# Patient Record
Sex: MALE | Race: WHITE | NOT HISPANIC OR LATINO | Employment: PART TIME
[De-identification: names, ages, dates, MRNs, and addresses within clinical notes are randomized per-mention and may not be internally consistent; named-entity substitution may affect disease eponyms.]

---

## 2017-07-08 ENCOUNTER — HEALTH MAINTENANCE LETTER (OUTPATIENT)
Age: 22
End: 2017-07-08

## 2020-09-22 ENCOUNTER — OFFICE VISIT (OUTPATIENT)
Dept: FAMILY MEDICINE | Facility: CLINIC | Age: 25
End: 2020-09-22
Payer: COMMERCIAL

## 2020-09-22 VITALS
HEART RATE: 87 BPM | DIASTOLIC BLOOD PRESSURE: 84 MMHG | TEMPERATURE: 98 F | HEIGHT: 71 IN | RESPIRATION RATE: 14 BRPM | WEIGHT: 184.4 LBS | SYSTOLIC BLOOD PRESSURE: 136 MMHG | BODY MASS INDEX: 25.81 KG/M2 | OXYGEN SATURATION: 99 %

## 2020-09-22 DIAGNOSIS — Z11.3 SCREEN FOR STD (SEXUALLY TRANSMITTED DISEASE): ICD-10-CM

## 2020-09-22 DIAGNOSIS — R30.0 DYSURIA: Primary | ICD-10-CM

## 2020-09-22 DIAGNOSIS — Z23 NEED FOR VACCINATION: ICD-10-CM

## 2020-09-22 DIAGNOSIS — Z23 NEED FOR PROPHYLACTIC VACCINATION AND INOCULATION AGAINST INFLUENZA: ICD-10-CM

## 2020-09-22 LAB
ALBUMIN UR-MCNC: NEGATIVE MG/DL
APPEARANCE UR: CLEAR
BILIRUB UR QL STRIP: NEGATIVE
COLOR UR AUTO: YELLOW
GLUCOSE UR STRIP-MCNC: NEGATIVE MG/DL
HGB UR QL STRIP: NEGATIVE
KETONES UR STRIP-MCNC: NEGATIVE MG/DL
LEUKOCYTE ESTERASE UR QL STRIP: NEGATIVE
NITRATE UR QL: NEGATIVE
PH UR STRIP: 7 PH (ref 5–7)
RBC #/AREA URNS AUTO: NORMAL /HPF
SOURCE: NORMAL
SP GR UR STRIP: 1.01 (ref 1–1.03)
UROBILINOGEN UR STRIP-ACNC: 0.2 EU/DL (ref 0.2–1)
WBC #/AREA URNS AUTO: NORMAL /HPF

## 2020-09-22 PROCEDURE — 87591 N.GONORRHOEAE DNA AMP PROB: CPT | Performed by: FAMILY MEDICINE

## 2020-09-22 PROCEDURE — 86780 TREPONEMA PALLIDUM: CPT | Performed by: FAMILY MEDICINE

## 2020-09-22 PROCEDURE — 99203 OFFICE O/P NEW LOW 30 MIN: CPT | Mod: 25 | Performed by: FAMILY MEDICINE

## 2020-09-22 PROCEDURE — 86803 HEPATITIS C AB TEST: CPT | Performed by: FAMILY MEDICINE

## 2020-09-22 PROCEDURE — 36415 COLL VENOUS BLD VENIPUNCTURE: CPT | Performed by: FAMILY MEDICINE

## 2020-09-22 PROCEDURE — 87389 HIV-1 AG W/HIV-1&-2 AB AG IA: CPT | Performed by: FAMILY MEDICINE

## 2020-09-22 PROCEDURE — 87491 CHLMYD TRACH DNA AMP PROBE: CPT | Performed by: FAMILY MEDICINE

## 2020-09-22 PROCEDURE — 90714 TD VACC NO PRESV 7 YRS+ IM: CPT | Performed by: FAMILY MEDICINE

## 2020-09-22 PROCEDURE — 90686 IIV4 VACC NO PRSV 0.5 ML IM: CPT | Performed by: FAMILY MEDICINE

## 2020-09-22 PROCEDURE — 90471 IMMUNIZATION ADMIN: CPT | Performed by: FAMILY MEDICINE

## 2020-09-22 PROCEDURE — 90472 IMMUNIZATION ADMIN EACH ADD: CPT | Performed by: FAMILY MEDICINE

## 2020-09-22 PROCEDURE — 81001 URINALYSIS AUTO W/SCOPE: CPT | Performed by: FAMILY MEDICINE

## 2020-09-22 ASSESSMENT — MIFFLIN-ST. JEOR: SCORE: 1852.52

## 2020-09-22 NOTE — PATIENT INSTRUCTIONS
STD screening tests have been ordered today.    You will be contacted in 1-2 days for results of your lab tests.    STD spread is by direct contact sexually to active infection.    You are advised to use condoms all the time for sexual intercourse, unless you and your partner are trying to conceive.    Practice safe sex all the time.      Patient Education     Dysuria with Uncertain Cause (Adult)    The urethra is the tube that allows urine to pass out of the body. In a woman, the urethra is the opening above the vagina. In men, the urethra is the opening on the tip of the penis. Dysuria is the feeling of pain or burning in the urethra when passing urine.  Dysuria can be caused by anything that irritates or inflames the urethra. An infection or chemical irritation can cause this reaction. A bladder infection is the most common cause of dysuria in adults. A urine test can diagnose this. A bladder infection needs antibiotic treatment.  Soaps, lotions, colognes and feminine hygiene products can cause dysuria. So can birth control jellies, creams, and foams. It will go away 1 to 3 days after using these irritants.  Sexually transmitted diseases (STDs) such as chlamydia or gonorrhea can cause dysuria. Your healthcare provider may take a culture sample. Your provider may start you on antibiotic medicine before the culture test returns.  In women who have gone through menopause, dysuria can be from dryness in the lining of the urethra. This can be treated with hormones. Dysuria becomes long-term (chronic) when it lasts for weeks or months. You may need to see a specialist (urologist) to diagnose and treat chronic dysuria.  Home care  These home care tips may help:    Don't use any chemicals or products that you think may be causing your symptoms.    If you were given a prescription medicine, take as directed. Be sure to take it until it is all used up.    If a culture was taken, don't have sex until you have been told that  it is negative. This means you don't have an infection. Then follow your healthcare provider's advice to treat your condition.  If a culture was done and it is positive:    Both you and your sexual partner may need to be treated. This is true even if your partner has no symptoms.    Contact your healthcare provider or go to an urgent care clinic or the public health department to be looked at and treated.    Don't have sex until both you and your partner(s) have finished all antibiotics and your healthcare provider says you are no longer contagious.    Learn about and use safe sex practices. The safest sex is with a partner who has tested negative and only has sex with you. Condoms can prevent STDs from spreading, but they aren't a guarantee.  Follow-up care  Follow up with your healthcare provider, or as advised. If a culture was taken, you may call as directed for the results. If you have an STD, follow up with your provider or the public health department for a complete STD screening, including HIV testing. For more information, contact CDC-INFO at 994-043-1791.  When to seek medical advice  Call your healthcare provider right away if any of these occur:    You aren't better after 3 days of treatment    Fever of 100.4 F (38 C) or higher, or as directed by your healthcare provider    Back or belly pain that gets worse    You can't urinate because of pain    New discharge from the urethra, vagina, or penis    Painful sores on the penis    Rash or joint pain    Painful lumps (lymph nodes) in the groin    Testicle pain or swelling of the scrotum  Date Last Reviewed: 11/1/2016 2000-2019 [employer]. [address] [place] [address], [place] [address]. All rights reserved. This information is not intended as a substitute for professional medical care. Always follow your healthcare professional's instructions.

## 2020-09-22 NOTE — PROGRESS NOTES
Subjective     Santos Hall is a 24 year old male who presents to clinic today for the following health issues:  Chief Complaint   Patient presents with     STD     Pt here for std screening, possible exposure.       HPI       Genitourinary - Male-possible std  Onset/Duration: 3 months  Description:   Dysuria (painful urination): YES}  Hematuria (blood in urine): no  Frequency: YES- slight increase  Waking at night to urinate: no  Hesitancy (delay in urine): no  Retention (unable to empty): no  Decrease in urinary flow: no  Incontinence: no  Progression of Symptoms:  same and constant  Accompanying Signs & Symptoms:  Fever: no  Back/Flank pain: no  Urethral discharge: no  Testicle lumps/masses/pain: no  Nausea and/or vomiting: no  Abdominal pain: no  History:   History of frequent UTI s: no  History of kidney stones: no  History of hernias: no  Sexually active: YES- possible exposure to std in late May   Patient  did have gonnorrhea and chlamydia in the past - this was 3-4 yrs ago - treated adequately.  Precipitating or alleviating factors: None  Therapies tried and outcome: none    There is no problem list on file for this patient.    History reviewed. No pertinent surgical history.    Social History     Tobacco Use     Smoking status: Never Smoker     Smokeless tobacco: Never Used   Substance Use Topics     Alcohol use: No     Alcohol/week: 0.0 standard drinks     Family History   Problem Relation Age of Onset     Hypertension Paternal Grandmother      Other Cancer Paternal Grandmother      Diabetes Paternal Grandfather      Asthma Sister          No current outpatient medications on file.     Allergies   Allergen Reactions     No Known Drug Allergies        Review of Systems   Constitutional, HEENT, cardiovascular, pulmonary, GI, , musculoskeletal, neuro, skin, endocrine and psych systems are negative, except as otherwise noted.      Objective    /84   Pulse 87   Temp 98  F (36.7  C) (Tympanic)   Resp  "14   Ht 1.81 m (5' 11.25\")   Wt 83.6 kg (184 lb 6.4 oz)   SpO2 99%   BMI 25.54 kg/m    Body mass index is 25.54 kg/m .  Physical Exam   GENERAL: alert and no distress, ambulatory w/o assist  SKIN: no suspicious lesions, no rashes  BACK: no CVA tenderness  ABD:  Flat, soft, no tenderness, no guarding, no mass palpable on exam    No results found for this or any previous visit (from the past 24 hour(s)).        Assessment & Plan     Santos was seen today for std.    Diagnoses and all orders for this visit:    Dysuria  -     Neisseria gonorrhoeae PCR  -     Chlamydia trachomatis PCR  -     UA with Microscopic reflex to Culture    Screen for STD (sexually transmitted disease)  -     Neisseria gonorrhoeae PCR  -     Hepatitis C Screen Reflex to HCV RNA Quant and Genotype  -     HIV Antigen Antibody Combo  -     Chlamydia trachomatis PCR  -     Treponema Abs w Reflex to RPR and Titer         Patient would like to get his flu shot and Td today.    Patient Instructions   STD screening tests have been ordered today.    You will be contacted in 1-2 days for results of your lab tests.    STD spread is by direct contact sexually to active infection.    You are advised to use condoms all the time for sexual intercourse, unless you and your partner are trying to conceive.    Practice safe sex all the time.      Patient Education     Dysuria with Uncertain Cause (Adult)    The urethra is the tube that allows urine to pass out of the body. In a woman, the urethra is the opening above the vagina. In men, the urethra is the opening on the tip of the penis. Dysuria is the feeling of pain or burning in the urethra when passing urine.  Dysuria can be caused by anything that irritates or inflames the urethra. An infection or chemical irritation can cause this reaction. A bladder infection is the most common cause of dysuria in adults. A urine test can diagnose this. A bladder infection needs antibiotic treatment.  Soaps, lotions, " colognes and feminine hygiene products can cause dysuria. So can birth control jellies, creams, and foams. It will go away 1 to 3 days after using these irritants.  Sexually transmitted diseases (STDs) such as chlamydia or gonorrhea can cause dysuria. Your healthcare provider may take a culture sample. Your provider may start you on antibiotic medicine before the culture test returns.  In women who have gone through menopause, dysuria can be from dryness in the lining of the urethra. This can be treated with hormones. Dysuria becomes long-term (chronic) when it lasts for weeks or months. You may need to see a specialist (urologist) to diagnose and treat chronic dysuria.  Home care  These home care tips may help:    Don't use any chemicals or products that you think may be causing your symptoms.    If you were given a prescription medicine, take as directed. Be sure to take it until it is all used up.    If a culture was taken, don't have sex until you have been told that it is negative. This means you don't have an infection. Then follow your healthcare provider's advice to treat your condition.  If a culture was done and it is positive:    Both you and your sexual partner may need to be treated. This is true even if your partner has no symptoms.    Contact your healthcare provider or go to an urgent care clinic or the public health department to be looked at and treated.    Don't have sex until both you and your partner(s) have finished all antibiotics and your healthcare provider says you are no longer contagious.    Learn about and use safe sex practices. The safest sex is with a partner who has tested negative and only has sex with you. Condoms can prevent STDs from spreading, but they aren't a guarantee.  Follow-up care  Follow up with your healthcare provider, or as advised. If a culture was taken, you may call as directed for the results. If you have an STD, follow up with your provider or the public health  department for a complete STD screening, including HIV testing. For more information, contact CDC-INFO at 272-350-1468.  When to seek medical advice  Call your healthcare provider right away if any of these occur:    You aren't better after 3 days of treatment    Fever of 100.4 F (38 C) or higher, or as directed by your healthcare provider    Back or belly pain that gets worse    You can't urinate because of pain    New discharge from the urethra, vagina, or penis    Painful sores on the penis    Rash or joint pain    Painful lumps (lymph nodes) in the groin    Testicle pain or swelling of the scrotum  Date Last Reviewed: 11/1/2016 2000-2019 [employer]. [address] [place] [address], [place] [address]. All rights reserved. This information is not intended as a substitute for professional medical care. Always follow your healthcare professional's instructions.               Return in about 1 week (around 9/29/2020) for If condition does not improve.    Gianni Rico MD  [facility]

## 2020-09-22 NOTE — NURSING NOTE
Prior to immunization administration, verified patients identity using patient s name and date of birth. Please see Immunization Activity for additional information.     Screening Questionnaire for Adult Immunization    Are you sick today?   No   Do you have allergies to medications, food, a vaccine component or latex?   No   Have you ever had a serious reaction after receiving a vaccination?   No   Do you have a long-term health problem with heart, lung, kidney, or metabolic disease (e.g., diabetes), asthma, a blood disorder, no spleen, complement component deficiency, a cochlear implant, or a spinal fluid leak?  Are you on long-term aspirin therapy?   No   Do you have cancer, leukemia, HIV/AIDS, or any other immune system problem?   No   Do you have a parent, brother, or sister with an immune system problem?   No   In the past 3 months, have you taken medications that affect  your immune system, such as prednisone, other steroids, or anticancer drugs; drugs for the treatment of rheumatoid arthritis, Crohn s disease, or psoriasis; or have you had radiation treatments?   No   Have you had a seizure, or a brain or other nervous system problem?   No   During the past year, have you received a transfusion of blood or blood    products, or been given immune (gamma) globulin or antiviral drug?   No   For women: Are you pregnant or is there a chance you could become       pregnant during the next month?   No   Have you received any vaccinations in the past 4 weeks?   No     Immunization questionnaire answers were all negative.        Per orders of Dr. Rico, injection of TD given by Kailee De La Rosa CMA. Patient instructed to remain in clinic for 15 minutes afterwards, and to report any adverse reaction to me immediately.       Screening performed by Kailee De La Rosa CMA on 9/22/2020 at 1:52 PM.

## 2020-09-23 LAB
C TRACH DNA SPEC QL NAA+PROBE: NEGATIVE
HCV AB SERPL QL IA: NONREACTIVE
HIV 1+2 AB+HIV1 P24 AG SERPL QL IA: NONREACTIVE
N GONORRHOEA DNA SPEC QL NAA+PROBE: NEGATIVE
SPECIMEN SOURCE: NORMAL
SPECIMEN SOURCE: NORMAL
T PALLIDUM AB SER QL: NONREACTIVE

## 2020-09-24 ENCOUNTER — TELEPHONE (OUTPATIENT)
Dept: FAMILY MEDICINE | Facility: CLINIC | Age: 25
End: 2020-09-24

## 2020-09-24 DIAGNOSIS — R39.9 URINARY SYMPTOM OR SIGN: Primary | ICD-10-CM

## 2020-09-24 NOTE — TELEPHONE ENCOUNTER
Message left for patient to return call to clinic.  CSS - ok to deliver message below.    Shraddha BAUER RN BSN

## 2020-09-24 NOTE — TELEPHONE ENCOUNTER
Patient was seen 9-22-20 for urinary symptoms.  STD and UA came back normal.  He is pushing fluids.  Symptoms persist - mild burning with urinaation, urgency, cloudy urine, foul odor.  He is to schedule VV if symptoms persist.  He is wondering if all labs are negative, how long does he wait for symptoms to subside?  Denies new or worsening symptoms, no fever, no blood in urine.      Routing to provider.  Shraddha BAUER RN

## 2020-09-24 NOTE — TELEPHONE ENCOUNTER
Patient notified of Dr. Rico's advice.  He is in the middle of class and could not make appts.  RN offered to make appts and he can call back after class and get the info.  CSS, when he calls back, give him msg below.  RN scheduled him for UA lab in [place] 8:30am tomorrow.  He then has a VV via telephone call 4:20pm with Dr. Varner (Dr. Rico is not in tomorrow).    Shraddha BAUER RN BSN

## 2020-09-24 NOTE — TELEPHONE ENCOUNTER
Reason for call:    Symptom or request:     Patient called stating he was seen on 09/22; he states that he continues to have urinary sx-- cloudy, odor to urine, burning sensations. Increase in frequency.       Best Time:  any    Can we leave a detailed message on this number?  YES     Candie ISAAC  Station

## 2020-09-24 NOTE — TELEPHONE ENCOUNTER
Repeat  urinalysis ordered as patient continues to experience the cloudy and foul smelling urine.  Schedule follow up virtual visit in the next 24 hours - make sure that the urinalysis is completed beforehand so there will be results to go over and help decide on next steps.

## 2020-09-25 ENCOUNTER — VIRTUAL VISIT (OUTPATIENT)
Dept: FAMILY MEDICINE | Facility: CLINIC | Age: 25
End: 2020-09-25
Payer: COMMERCIAL

## 2020-09-25 DIAGNOSIS — R30.0 DYSURIA: Primary | ICD-10-CM

## 2020-09-25 DIAGNOSIS — R39.9 URINARY SYMPTOM OR SIGN: ICD-10-CM

## 2020-09-25 LAB
ALBUMIN UR-MCNC: NEGATIVE MG/DL
AMORPH CRY #/AREA URNS HPF: ABNORMAL /HPF
APPEARANCE UR: ABNORMAL
BILIRUB UR QL STRIP: NEGATIVE
COLOR UR AUTO: YELLOW
GLUCOSE UR STRIP-MCNC: NEGATIVE MG/DL
HGB UR QL STRIP: NEGATIVE
KETONES UR STRIP-MCNC: NEGATIVE MG/DL
LEUKOCYTE ESTERASE UR QL STRIP: NEGATIVE
NITRATE UR QL: NEGATIVE
PH UR STRIP: 6.5 PH (ref 5–7)
RBC #/AREA URNS AUTO: ABNORMAL /HPF
SOURCE: ABNORMAL
SP GR UR STRIP: 1.02 (ref 1–1.03)
UROBILINOGEN UR STRIP-ACNC: 1 EU/DL (ref 0.2–1)
WBC #/AREA URNS AUTO: ABNORMAL /HPF

## 2020-09-25 PROCEDURE — 99213 OFFICE O/P EST LOW 20 MIN: CPT | Mod: TEL | Performed by: INTERNAL MEDICINE

## 2020-09-25 PROCEDURE — 81001 URINALYSIS AUTO W/SCOPE: CPT | Performed by: FAMILY MEDICINE

## 2020-09-25 NOTE — PROGRESS NOTES
"Santos Hall is a 24 year old male who is being evaluated via a billable telephone visit.      The patient has been notified of following:     \"This telephone visit will be conducted via a call between you and your physician/provider. We have found that certain health care needs can be provided without the need for a physical exam.  This service lets us provide the care you need with a short phone conversation.  If a prescription is necessary we can send it directly to your pharmacy.  If lab work is needed we can place an order for that and you can then stop by our lab to have the test done at a later time.    Telephone visits are billed at different rates depending on your insurance coverage. During this emergency period, for some insurers they may be billed the same as an in-person visit.  Please reach out to your insurance provider with any questions.    If during the course of the call the physician/provider feels a telephone visit is not appropriate, you will not be charged for this service.\"    Patient has given verbal consent for Telephone visit?  Yes    What phone number would you like to be contacted at? [phone]    How would you like to obtain your AVS? Mail a copy    Subjective     Santos Hall is a 24 year old male who presents via phone visit today for the following health issues:    Chief Complaint   Patient presents with     Results     Would look to go over UA results from today          HPI     Genitourinary symptoms      Duration: 3 to 4 months     Description:  Very mild dysuria and urine has had a strong smell    Intensity:  mild    Accompanying signs and symptoms (fever/discharge/nausea/vomiting/back or abdominal pain):  No flank pain.  No hematuria.  No rash around the urethra or discharge.  No fevers or chills.  No pelvic pain    History (frequent UTI's/kidney stones/prostate problems): None  Sexually active: YES    Precipitating or alleviating factors: None    Therapies tried and outcome: " none.  Drinking plenty of water     9/22/20 - Saw by Dr. Rico for Dysuria + STD   Yesterday Pt reported that continues have urinary sx - cloudy, odor to urine, burning sensations. Slight increase in frequency. - RN triaged : state pt is pushing fluids all labs from 9/22/20 come back normal. - advise by DR. Rico to repeat UA and schedule virtual visit.         Review of Systems   Constitutional and gu systems are negative, except as otherwise noted.       Objective          Vitals:  No vitals were obtained today due to virtual visit.    healthy, alert and no distress  PSYCH: Alert and oriented times 3; coherent speech, normal   rate and volume, able to articulate logical thoughts, able   to abstract reason, no tangential thoughts, no hallucinations   or delusions  His affect is normal  RESP: No cough, no audible wheezing, able to talk in full sentences  Remainder of exam unable to be completed due to telephone visits    Results for orders placed or performed in visit on 09/25/20 (from the past 24 hour(s))   UA with Microscopic reflex to Culture    Specimen: Midstream Urine   Result Value Ref Range    Color Urine Yellow     Appearance Urine Cloudy     Glucose Urine Negative NEG^Negative mg/dL    Bilirubin Urine Negative NEG^Negative    Ketones Urine Negative NEG^Negative mg/dL    Specific Gravity Urine 1.020 1.003 - 1.035    pH Urine 6.5 5.0 - 7.0 pH    Protein Albumin Urine Negative NEG^Negative mg/dL    Urobilinogen Urine 1.0 0.2 - 1.0 EU/dL    Nitrite Urine Negative NEG^Negative    Blood Urine Negative NEG^Negative    Leukocyte Esterase Urine Negative NEG^Negative    Source Midstream Urine     WBC Urine 0 - 5 OTO5^0 - 5 /HPF    RBC Urine O - 2 OTO2^O - 2 /HPF    Amorphous Crystals Many (A) NEG^Negative /HPF           Assessment/Plan:    Assessment & Plan     Dysuria    Santos presents with a few months of ongoing mild dysuria and slight increase in frequency.  U/A and STI testing were done on 9/22 and were normal.  U/A repeated today and only showed some amorphous crystals.  He is not having any symptoms to indicate renal stones- discussed getting U/S to further eval these crystals but he defers for now.  No glucose in the urine to suggest diabetes as cause for increased urinary frequency.  Overall, his symptoms are fairly mild and he is reassured that testing has not shown any concerns.  He will follow-up with urology if symptoms worsen.      - UROLOGY ADULT REFERRAL; Future     GriceldaJami Varner MD  [facility]    Phone call duration:  5 minutes

## 2021-05-23 ENCOUNTER — HEALTH MAINTENANCE LETTER (OUTPATIENT)
Age: 26
End: 2021-05-23

## 2021-09-12 ENCOUNTER — HEALTH MAINTENANCE LETTER (OUTPATIENT)
Age: 26
End: 2021-09-12

## 2022-06-19 ENCOUNTER — HEALTH MAINTENANCE LETTER (OUTPATIENT)
Age: 27
End: 2022-06-19

## 2022-09-09 ENCOUNTER — APPOINTMENT (OUTPATIENT)
Dept: GENERAL RADIOLOGY | Facility: CLINIC | Age: 27
End: 2022-09-09
Attending: EMERGENCY MEDICINE
Payer: COMMERCIAL

## 2022-09-09 ENCOUNTER — HOSPITAL ENCOUNTER (OUTPATIENT)
Facility: CLINIC | Age: 27
Setting detail: OBSERVATION
Discharge: HOME | End: 2022-09-10
Attending: EMERGENCY MEDICINE | Admitting: INTERNAL MEDICINE
Payer: COMMERCIAL

## 2022-09-09 ENCOUNTER — APPOINTMENT (OUTPATIENT)
Dept: CT IMAGING | Facility: CLINIC | Age: 27
End: 2022-09-09
Attending: EMERGENCY MEDICINE
Payer: COMMERCIAL

## 2022-09-09 DIAGNOSIS — J93.83 SPONTANEOUS PNEUMOTHORAX: Primary | ICD-10-CM

## 2022-09-09 DIAGNOSIS — R07.81 PLEURITIC CHEST PAIN: ICD-10-CM

## 2022-09-09 DIAGNOSIS — J93.83 SPONTANEOUS PNEUMOTHORAX: ICD-10-CM

## 2022-09-09 DIAGNOSIS — U07.1 LAB TEST POSITIVE FOR DETECTION OF COVID-19 VIRUS: ICD-10-CM

## 2022-09-09 DIAGNOSIS — R93.89 ABNORMAL CT OF THE CHEST: ICD-10-CM

## 2022-09-09 PROBLEM — J45.990 EXERCISE-INDUCED ASTHMA: Status: ACTIVE | Noted: 2022-09-09

## 2022-09-09 LAB
ANION GAP SERPL CALCULATED.3IONS-SCNC: 15 MMOL/L (ref 7–15)
BASOPHILS # BLD AUTO: 0 10E3/UL (ref 0–0.2)
BASOPHILS NFR BLD AUTO: 0 %
BUN SERPL-MCNC: 19.3 MG/DL (ref 6–20)
CALCIUM SERPL-MCNC: 9 MG/DL (ref 8.6–10)
CHLORIDE SERPL-SCNC: 104 MMOL/L (ref 98–107)
CREAT SERPL-MCNC: 1.08 MG/DL (ref 0.67–1.17)
DEPRECATED HCO3 PLAS-SCNC: 19 MMOL/L (ref 22–29)
EOSINOPHIL # BLD AUTO: 0.1 10E3/UL (ref 0–0.7)
EOSINOPHIL NFR BLD AUTO: 2 %
ERYTHROCYTE [DISTWIDTH] IN BLOOD BY AUTOMATED COUNT: 11.9 % (ref 10–15)
GFR SERPL CREATININE-BSD FRML MDRD: >90 ML/MIN/1.73M2
GLUCOSE SERPL-MCNC: 108 MG/DL (ref 70–99)
HCT VFR BLD AUTO: 43.3 % (ref 40–53)
HGB BLD-MCNC: 15.8 G/DL (ref 13.3–17.7)
HOLD SPECIMEN: NORMAL
IMM GRANULOCYTES # BLD: 0 10E3/UL
IMM GRANULOCYTES NFR BLD: 0 %
LYMPHOCYTES # BLD AUTO: 2.3 10E3/UL (ref 0.8–5.3)
LYMPHOCYTES NFR BLD AUTO: 25 %
MCH RBC QN AUTO: 29.6 PG (ref 26.5–33)
MCHC RBC AUTO-ENTMCNC: 36.5 G/DL (ref 31.5–36.5)
MCV RBC AUTO: 81 FL (ref 78–100)
MONOCYTES # BLD AUTO: 1 10E3/UL (ref 0–1.3)
MONOCYTES NFR BLD AUTO: 11 %
NEUTROPHILS # BLD AUTO: 5.6 10E3/UL (ref 1.6–8.3)
NEUTROPHILS NFR BLD AUTO: 62 %
NRBC # BLD AUTO: 0 10E3/UL
NRBC BLD AUTO-RTO: 0 /100
PLATELET # BLD AUTO: 217 10E3/UL (ref 150–450)
POTASSIUM SERPL-SCNC: 4.5 MMOL/L (ref 3.4–5.3)
RBC # BLD AUTO: 5.33 10E6/UL (ref 4.4–5.9)
SARS-COV-2 RNA RESP QL NAA+PROBE: POSITIVE
SODIUM SERPL-SCNC: 138 MMOL/L (ref 136–145)
TROPONIN T SERPL HS-MCNC: 6 NG/L
WBC # BLD AUTO: 9.1 10E3/UL (ref 4–11)

## 2022-09-09 PROCEDURE — 84484 ASSAY OF TROPONIN QUANT: CPT | Performed by: EMERGENCY MEDICINE

## 2022-09-09 PROCEDURE — 87635 SARS-COV-2 COVID-19 AMP PRB: CPT | Performed by: EMERGENCY MEDICINE

## 2022-09-09 PROCEDURE — 80048 BASIC METABOLIC PNL TOTAL CA: CPT | Performed by: EMERGENCY MEDICINE

## 2022-09-09 PROCEDURE — 99285 EMERGENCY DEPT VISIT HI MDM: CPT | Mod: 25 | Performed by: EMERGENCY MEDICINE

## 2022-09-09 PROCEDURE — 93005 ELECTROCARDIOGRAM TRACING: CPT | Performed by: EMERGENCY MEDICINE

## 2022-09-09 PROCEDURE — G0378 HOSPITAL OBSERVATION PER HR: HCPCS

## 2022-09-09 PROCEDURE — 71046 X-RAY EXAM CHEST 2 VIEWS: CPT

## 2022-09-09 PROCEDURE — 250N000013 HC RX MED GY IP 250 OP 250 PS 637: Performed by: EMERGENCY MEDICINE

## 2022-09-09 PROCEDURE — 250N000011 HC RX IP 250 OP 636: Performed by: EMERGENCY MEDICINE

## 2022-09-09 PROCEDURE — 93010 ELECTROCARDIOGRAM REPORT: CPT | Performed by: EMERGENCY MEDICINE

## 2022-09-09 PROCEDURE — 96372 THER/PROPH/DIAG INJ SC/IM: CPT | Performed by: EMERGENCY MEDICINE

## 2022-09-09 PROCEDURE — 99220 PR INITIAL OBSERVATION CARE,LEVEL III: CPT | Mod: CS | Performed by: PHYSICIAN ASSISTANT

## 2022-09-09 PROCEDURE — 85025 COMPLETE CBC W/AUTO DIFF WBC: CPT | Performed by: EMERGENCY MEDICINE

## 2022-09-09 PROCEDURE — 36415 COLL VENOUS BLD VENIPUNCTURE: CPT | Performed by: EMERGENCY MEDICINE

## 2022-09-09 PROCEDURE — C9803 HOPD COVID-19 SPEC COLLECT: HCPCS | Performed by: EMERGENCY MEDICINE

## 2022-09-09 PROCEDURE — 71250 CT THORAX DX C-: CPT

## 2022-09-09 RX ORDER — ACETAMINOPHEN 650 MG/1
650 SUPPOSITORY RECTAL EVERY 6 HOURS PRN
Status: DISCONTINUED | OUTPATIENT
Start: 2022-09-09 | End: 2022-09-10 | Stop reason: HOSPADM

## 2022-09-09 RX ORDER — ONDANSETRON 2 MG/ML
4 INJECTION INTRAMUSCULAR; INTRAVENOUS EVERY 6 HOURS PRN
Status: DISCONTINUED | OUTPATIENT
Start: 2022-09-09 | End: 2022-09-10 | Stop reason: HOSPADM

## 2022-09-09 RX ORDER — NALOXONE HYDROCHLORIDE 0.4 MG/ML
0.4 INJECTION, SOLUTION INTRAMUSCULAR; INTRAVENOUS; SUBCUTANEOUS
Status: DISCONTINUED | OUTPATIENT
Start: 2022-09-09 | End: 2022-09-10 | Stop reason: HOSPADM

## 2022-09-09 RX ORDER — NALOXONE HYDROCHLORIDE 0.4 MG/ML
0.2 INJECTION, SOLUTION INTRAMUSCULAR; INTRAVENOUS; SUBCUTANEOUS
Status: DISCONTINUED | OUTPATIENT
Start: 2022-09-09 | End: 2022-09-10 | Stop reason: HOSPADM

## 2022-09-09 RX ORDER — KETOROLAC TROMETHAMINE 30 MG/ML
30 INJECTION, SOLUTION INTRAMUSCULAR; INTRAVENOUS
Status: COMPLETED | OUTPATIENT
Start: 2022-09-09 | End: 2022-09-09

## 2022-09-09 RX ORDER — ONDANSETRON 4 MG/1
4 TABLET, ORALLY DISINTEGRATING ORAL EVERY 6 HOURS PRN
Status: DISCONTINUED | OUTPATIENT
Start: 2022-09-09 | End: 2022-09-10 | Stop reason: HOSPADM

## 2022-09-09 RX ORDER — ACETAMINOPHEN 500 MG
1000 TABLET ORAL ONCE
Status: COMPLETED | OUTPATIENT
Start: 2022-09-09 | End: 2022-09-09

## 2022-09-09 RX ORDER — ACETAMINOPHEN 325 MG/1
650 TABLET ORAL EVERY 6 HOURS PRN
Status: DISCONTINUED | OUTPATIENT
Start: 2022-09-09 | End: 2022-09-10 | Stop reason: HOSPADM

## 2022-09-09 RX ORDER — IBUPROFEN 600 MG/1
600 TABLET, FILM COATED ORAL EVERY 6 HOURS PRN
Status: DISCONTINUED | OUTPATIENT
Start: 2022-09-09 | End: 2022-09-10 | Stop reason: HOSPADM

## 2022-09-09 RX ORDER — OXYCODONE HYDROCHLORIDE 5 MG/1
5 TABLET ORAL EVERY 4 HOURS PRN
Status: DISCONTINUED | OUTPATIENT
Start: 2022-09-09 | End: 2022-09-10 | Stop reason: HOSPADM

## 2022-09-09 RX ADMIN — ACETAMINOPHEN 1000 MG: 500 TABLET, FILM COATED ORAL at 09:46

## 2022-09-09 RX ADMIN — KETOROLAC TROMETHAMINE 30 MG: 30 INJECTION, SOLUTION INTRAMUSCULAR at 09:47

## 2022-09-09 ASSESSMENT — ENCOUNTER SYMPTOMS
GASTROINTESTINAL NEGATIVE: 1
RESPIRATORY NEGATIVE: 1
CONSTITUTIONAL NEGATIVE: 1
ENDOCRINE NEGATIVE: 1
MUSCULOSKELETAL NEGATIVE: 1
PSYCHIATRIC NEGATIVE: 1
ALLERGIC/IMMUNOLOGIC NEGATIVE: 1
HEMATOLOGIC/LYMPHATIC NEGATIVE: 1
NEUROLOGICAL NEGATIVE: 1
EYES NEGATIVE: 1

## 2022-09-09 ASSESSMENT — ACTIVITIES OF DAILY LIVING (ADL)
ADLS_ACUITY_SCORE: 18
ADLS_ACUITY_SCORE: 35
ADLS_ACUITY_SCORE: 35
ADLS_ACUITY_SCORE: 18
ADLS_ACUITY_SCORE: 35

## 2022-09-09 NOTE — ED PROVIDER NOTES
History     Chief Complaint   Patient presents with     Chest Pain     HPI  Santos Hall is a 26 year old male who presents for evaluation with left-sided chest pain worse with deep inspiration.     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  Pain is rated a 6-7 out of 10.  He vapes casually.  He reports that his grandfather had 4 heart attacks in the initial one began in the 50s.  No personal history of heart disease.  He also reports he had no fever or chills and no cough.  Due to persistent pain since last night and a restless night he presents for further care.  Pain is reported to radiate through his left chest towards the scapula.  No neck pain.    Allergies:  Allergies   Allergen Reactions     No Known Drug Allergies        Problem List:    Patient Active Problem List    Diagnosis Date Noted     Spontaneous pneumothorax 09/09/2022     Priority: Medium     Pleuritic chest pain 09/09/2022     Priority: Medium     Abnormal CT of the chest 09/09/2022     Priority: Medium     Lab test positive for detection of COVID-19 virus 09/09/2022     Priority: Medium        Past Medical History:    No past medical history on file.    Past Surgical History:    No past surgical history on file.    Family History:    Family History   Problem Relation Age of Onset     Hypertension Paternal Grandmother      Other Cancer Paternal Grandmother      Diabetes Paternal Grandfather      Asthma Sister        Social History:  Marital Status:  Single [1]  Social History     Tobacco Use     Smoking status: Never Smoker     Smokeless tobacco: Never Used   Substance Use Topics     Alcohol use: No     Alcohol/week: 0.0 standard drinks     Drug use: No        Medications:    No current outpatient medications on file.        Review of Systems   Constitutional: Negative.    HENT: Negative.    Eyes: Negative.    Respiratory: Negative.   "  Cardiovascular: Positive for chest pain.   Gastrointestinal: Negative.    Endocrine: Negative.    Genitourinary: Negative.    Musculoskeletal: Negative.    Skin: Negative.    Allergic/Immunologic: Negative.    Neurological: Negative.    Hematological: Negative.    Psychiatric/Behavioral: Negative.    All other systems reviewed and are negative.      Physical Exam   BP: (!) 146/61  Pulse: 110  Temp: 98.5  F (36.9  C)  Resp: 18  Height: 175.3 cm (5' 9\")  Weight: 77.1 kg (170 lb)  SpO2: 100 %      Physical Exam  Constitutional:       General: He is not in acute distress.     Appearance: He is well-developed. He is not ill-appearing, toxic-appearing or diaphoretic.   HENT:      Head: Normocephalic and atraumatic.   Eyes:      Extraocular Movements: Extraocular movements intact.      Pupils: Pupils are equal, round, and reactive to light.   Cardiovascular:      Rate and Rhythm: Normal rate and regular rhythm.      Heart sounds: Normal heart sounds.   Pulmonary:      Breath sounds: Examination of the left-upper field reveals decreased breath sounds. Decreased breath sounds present.   Chest:      Chest wall: No mass, deformity, tenderness, crepitus or edema. There is no dullness to percussion.   Musculoskeletal:      Right lower leg: No tenderness. No edema.      Left lower leg: No tenderness. No edema.   Skin:     Capillary Refill: Capillary refill takes less than 2 seconds.      Coloration: Skin is not cyanotic or pale.      Findings: No ecchymosis, erythema or rash.      Nails: There is no clubbing.   Neurological:      General: No focal deficit present.      Mental Status: He is alert and oriented to person, place, and time.   Psychiatric:         Mood and Affect: Mood normal. Mood is not anxious.         Behavior: Behavior normal. Behavior is not agitated.         ED Course               Procedures              EKG Interpretation:      Interpreted by Michele Payne MD  Time reviewed: Obtained in triage at " 0702, reviewed at 0710  Symptoms at time of EKG: Left-sided chest pain  Rhythm: normal sinus   Rate: Normal  Axis: Normal  Ectopy: none  Conduction: normal  ST Segments/ T Waves: Non-specific ST-T wave changes  Q Waves: nonspecific  Comparison to prior: No old EKG for comparison    Clinical Impression: no acute changes      Critical Care time:  none        ED medications:  Medications   acetaminophen (TYLENOL) tablet 1,000 mg (1,000 mg Oral Given 9/9/22 0946)   ketorolac (TORADOL) injection 30 mg (30 mg Intramuscular Given 9/9/22 0947)       ED Vitals:  Vitals:    09/09/22 1000 09/09/22 1030 09/09/22 1100 09/09/22 1130   BP: 131/72 119/79 119/86    Pulse: 58 51 58 86   Resp:   19    Temp:       TempSrc:       SpO2: 99% 99% 97% 100%   Weight:       Height:         ED labs and imaging:  Results for orders placed or performed during the hospital encounter of 09/09/22 (from the past 24 hour(s))   CBC with Platelets & Differential    Narrative    The following orders were created for panel order CBC with Platelets & Differential.  Procedure                               Abnormality         Status                     ---------                               -----------         ------                     CBC with platelets and d...[[id number]]                      Final result                 Please view results for these tests on the individual orders.   Basic metabolic panel   Result Value Ref Range    Creatinine 1.08 0.67 - 1.17 mg/dL    Sodium 138 136 - 145 mmol/L    Potassium 4.5 3.4 - 5.3 mmol/L    Urea Nitrogen 19.3 6.0 - 20.0 mg/dL    Chloride 104 98 - 107 mmol/L    Carbon Dioxide (CO2) 19 (L) 22 - 29 mmol/L    Anion Gap 15 7 - 15 mmol/L    Glucose 108 (H) 70 - 99 mg/dL    GFR Estimate >90 >60 mL/min/1.73m2    Calcium 9.0 8.6 - 10.0 mg/dL   Troponin T, High Sensitivity   Result Value Ref Range    Troponin T, High Sensitivity 6 <=22 ng/L   [place] Draw    Narrative    The following orders were created for panel order  [place] Draw.  Procedure                               Abnormality         Status                     ---------                               -----------         ------                     Extra Blue Top Tube[[id number]]                              Final result               Extra Red Top Tube[[id number]]                               Final result               Extra Green Top (Lithium...[[id number]]                      Final result               Extra Purple Top Tube[[id number]]                            Final result                 Please view results for these tests on the individual orders.   CBC with platelets and differential   Result Value Ref Range    WBC Count 9.1 4.0 - 11.0 10e3/uL    RBC Count 5.33 4.40 - 5.90 10e6/uL    Hemoglobin 15.8 13.3 - 17.7 g/dL    Hematocrit 43.3 40.0 - 53.0 %    MCV 81 78 - 100 fL    MCH 29.6 26.5 - 33.0 pg    MCHC 36.5 31.5 - 36.5 g/dL    RDW 11.9 10.0 - 15.0 %    Platelet Count 217 150 - 450 10e3/uL    % Neutrophils 62 %    % Lymphocytes 25 %    % Monocytes 11 %    % Eosinophils 2 %    % Basophils 0 %    % Immature Granulocytes 0 %    NRBCs per 100 WBC 0 <1 /100    Absolute Neutrophils 5.6 1.6 - 8.3 10e3/uL    Absolute Lymphocytes 2.3 0.8 - 5.3 10e3/uL    Absolute Monocytes 1.0 0.0 - 1.3 10e3/uL    Absolute Eosinophils 0.1 0.0 - 0.7 10e3/uL    Absolute Basophils 0.0 0.0 - 0.2 10e3/uL    Absolute Immature Granulocytes 0.0 <=0.4 10e3/uL    Absolute NRBCs 0.0 10e3/uL   Extra Blue Top Tube   Result Value Ref Range    Hold Specimen JIC    Extra Red Top Tube   Result Value Ref Range    Hold Specimen JIC    Extra Green Top (Lithium Heparin) Tube   Result Value Ref Range    Hold Specimen JIC    Extra Purple Top Tube   Result Value Ref Range    Hold Specimen JIC    Chest XR,  PA & LAT    Narrative    CHEST TWO VIEWS 9/9/2022 9:43 AM     HISTORY: Pleuritic left-sided chest pain. Evaluate for acute  cardiopulmonary process.    COMPARISON: None.       Impression    IMPRESSION: Cardiac  silhouette is within normal limits. No infiltrate  or effusion. Small left apical pneumothorax with a pleural to pleural  distance of 16 mm in the left apex. No significant bony abnormalities.    CHARITY CORNELL MD         SYSTEM ID:  [id number]   Chest CT w/o contrast    Narrative    CT CHEST WITHOUT CONTRAST 9/9/2022 11:43 AM    CLINICAL HISTORY: Spontaneous left apical pneumothorax on chest x-ray.  Vaper. Evaluate for blebs versus other process that would contribute  to pneumothorax versus additional details.    TECHNIQUE: CT chest without IV contrast. Multiplanar reformats were  obtained. Dose reduction techniques were used.  CONTRAST: None.    COMPARISON: Chest x-ray dated 9/9/2022    FINDINGS:   LUNGS AND PLEURA: Very small left apical pneumothorax is present.  There are a couple of tiny blebs seen in the left upper lobe. No  significant emphysema, infiltrate, or effusion.    MEDIASTINUM/AXILLAE: No adenopathy or pericardial effusion. No  significant coronary artery calcification.    UPPER ABDOMEN: A few tiny calcifications are seen throughout the  spleen.    MUSCULOSKELETAL: Unremarkable.      Impression    IMPRESSION:   1.  Very small left apical pneumothorax. There are a couple of tiny  blebs in the left upper lobe, but the lungs are otherwise  unremarkable.   Asymptomatic COVID-19 Virus (Coronavirus) by PCR Nose    Specimen: Nose; Swab   Result Value Ref Range    SARS CoV2 PCR Positive (A) Negative    Narrative    Testing was performed using the rajani  SARS-CoV-2 & Influenza A/B Assay on the rajani  Ute  System.  This test should be ordered for the detection of SARS-COV-2 in individuals who meet SARS-CoV-2 clinical and/or epidemiological criteria. Test performance is unknown in asymptomatic patients.  This test is for in vitro diagnostic use under the FDA EUA for laboratories certified under CLIA to perform moderate and/or high complexity testing. This test has not been FDA cleared or approved.  A  negative test does not rule out the presence of PCR inhibitors in the specimen or target RNA in concentration below the limit of detection for the assay. The possibility of a false negative should be considered if the patient's recent exposure or clinical presentation suggests COVID-19.  [facility] are certified under the Clinical Laboratory Improvement Amendments of 1988 (CLIA-88) as qualified to perform moderate and/or high complexity laboratory testing.     Assessments & Plan (with Medical Decision Making)   Assessment Summary and clinical Impression: 26-year-old male who presented with left-sided chest discomfort.  Patient described pain as pleuritic in nature and began suddenly without trauma.  He arrived rating his pain a 6-7 out of 10 but appeared in no acute distress.  100% on room air intake blood pressure was 148/86.  No personal history of heart disease.  Vapes casually.  Chest imaging obtained to evaluate for acute causes for chest pain and blood work is monitored frequent vital signs.  Imaging revealed a small left apical pneumothorax.  CT chest revealed tiny blebs in the left upper lobe. After consultation with thoracic surgery plan is to admit for observation overnight and follow-up chest imaging to ensure no interval progression of his pneumothorax that does not require immediate or emergent tube thoracostomy.  If no interval change may be discharged with plan for further follow-up with thoracic surgery.  If while inpatient patient develops worsening pneumothorax tube thoracostomy will likely be required and general surgeon on-call is on board.    At shift end patient awaiting admission to the medical floor for on-going care.    ED course and Plan:  EKG obtained in triage revealed normal sinus rhythm with nonspecific T wave changes but no acute ischemia or arrhythmia.  There is no old EKG available for comparison.  We discussed and reviewed possible causes for his pain and  discomfort.  He was offered oral Tylenol and intramuscular ketorolac as he did not want IV placed.  He had also not take anything for his discomfort.  Patient's troponin was within normal limits.  X-ray chest imaging per radiology noted small left apical pneumothorax.  Radiology measured pleural pleural distance of 16 mm in the left apex.  See additional details in radiology report.    With small left apical pneumothorax with no respiratory distress patient was placed on supplemental oxygen I reviewed treatment options with the patient after my discussion with general surgery.    Spoke with Dr. Davis at 11.12am- we reviewed small apical left spontaneous pneumothorax likely related to his vaping.  We discussed disposition planning including ED boarding for prolonged observation versus admission to medicine.  We agreed that advanced imaging may be helpful to further elucidate his cause for spontaneous pneumothorax as patient is hemodynamically normal 100% on room air although he presented with pleuritic unilateral chest discomfort.  CT imaging to help with evaluation for blebs and for further follow-up or referral to thoracic surgery if needed.    CT chest without contrast revealed very small left apical pneumothorax with a couple of tiny blebs in the left upper lobe.  There was no worsening emphysema or infiltrate or effusion.  See additional details and the radiology report    With CT chest findings and patient's presentation with thoracic surgery at [facility] at Dr. CHRISTOPHE Hernandez at 12.25pm. Dr Caren Dubose advised observation for 24 hours and follow-up with chest imaging to ensure no interval progression if no progression and patient continues to do well he can be discharged.  His care team will reach out to the patient next week for outpatient follow-up.    I updated Dr. Perez-surgeon on-call here at [facility] about discussion with thoracic surgery and plan to admit to medicine for observation overnight with  repeat chest imaging and in the event that his pneumothorax enlarges further intervention by surgery may be required.  Dr. Perez was in agreement with this plan.  Patient is found to have positive COVID-19 test obtained for inpatient admission    Spoke with admitting provider- PIEDAD Presley PA-C at 3.30pm who agreed to accept patient for admission after reviewing presentation, imaging and consultation with generals surgery and thoracic surgery.    At shift end  At 4pm patient was awaiting admission to the medical floor.      Disclaimer: This note consists of symbols derived from keyboarding, dictation and/or voice recognition software. As a result, there may be errors in the script that have gone undetected. Please consider this when interpreting information found in this chart.  I have reviewed the nursing notes.    I have reviewed the findings, diagnosis, plan and need for follow up with the patient.       New Prescriptions    No medications on file       Final diagnoses:   Spontaneous pneumothorax - apical left pneumothorax   Pleuritic chest pain - since the night prior   Abnormal CT of the chest - Small left apical pneumothorax with blebs in the left upper lobe   Lab test positive for detection of COVID-19 virus - Obtained during routine hospital admission screen       9/9/2022   [facility] EMERGENCY DEPT     Michele Payne MD  09/09/22 9652

## 2022-09-09 NOTE — ED TRIAGE NOTES
Left chest pain  Worse with inspiration      Triage Assessment     Row Name 09/09/22 0657       Triage Assessment (Adult)    Airway WDL WDL       Respiratory WDL    Respiratory WDL WDL       Skin Circulation/Temperature WDL    Skin Circulation/Temperature WDL WDL       Cardiac WDL    Cardiac WDL chest pain       Chest Pain Assessment    Chest Pain Location anterior chest, left

## 2022-09-10 ENCOUNTER — APPOINTMENT (OUTPATIENT)
Dept: GENERAL RADIOLOGY | Facility: CLINIC | Age: 27
End: 2022-09-10
Attending: PHYSICIAN ASSISTANT
Payer: COMMERCIAL

## 2022-09-10 VITALS
WEIGHT: 170 LBS | HEART RATE: 69 BPM | TEMPERATURE: 98.3 F | BODY MASS INDEX: 25.18 KG/M2 | RESPIRATION RATE: 18 BRPM | HEIGHT: 69 IN | OXYGEN SATURATION: 100 % | SYSTOLIC BLOOD PRESSURE: 122 MMHG | DIASTOLIC BLOOD PRESSURE: 68 MMHG

## 2022-09-10 LAB
ANION GAP SERPL CALCULATED.3IONS-SCNC: 12 MMOL/L (ref 7–15)
BUN SERPL-MCNC: 14.3 MG/DL (ref 6–20)
CALCIUM SERPL-MCNC: 9.4 MG/DL (ref 8.6–10)
CHLORIDE SERPL-SCNC: 106 MMOL/L (ref 98–107)
CREAT SERPL-MCNC: 0.95 MG/DL (ref 0.67–1.17)
DEPRECATED HCO3 PLAS-SCNC: 22 MMOL/L (ref 22–29)
ERYTHROCYTE [DISTWIDTH] IN BLOOD BY AUTOMATED COUNT: 12 % (ref 10–15)
GFR SERPL CREATININE-BSD FRML MDRD: >90 ML/MIN/1.73M2
GLUCOSE SERPL-MCNC: 104 MG/DL (ref 70–99)
HCT VFR BLD AUTO: 43.7 % (ref 40–53)
HGB BLD-MCNC: 15.3 G/DL (ref 13.3–17.7)
HOLD SPECIMEN: NORMAL
MCH RBC QN AUTO: 29.3 PG (ref 26.5–33)
MCHC RBC AUTO-ENTMCNC: 35 G/DL (ref 31.5–36.5)
MCV RBC AUTO: 84 FL (ref 78–100)
PLATELET # BLD AUTO: 178 10E3/UL (ref 150–450)
POTASSIUM SERPL-SCNC: 4.5 MMOL/L (ref 3.4–5.3)
RBC # BLD AUTO: 5.23 10E6/UL (ref 4.4–5.9)
SODIUM SERPL-SCNC: 140 MMOL/L (ref 136–145)
WBC # BLD AUTO: 6.1 10E3/UL (ref 4–11)

## 2022-09-10 PROCEDURE — 99217 PR OBSERVATION CARE DISCHARGE: CPT | Mod: CS | Performed by: INTERNAL MEDICINE

## 2022-09-10 PROCEDURE — G0378 HOSPITAL OBSERVATION PER HR: HCPCS

## 2022-09-10 PROCEDURE — 71046 X-RAY EXAM CHEST 2 VIEWS: CPT

## 2022-09-10 PROCEDURE — 36415 COLL VENOUS BLD VENIPUNCTURE: CPT | Performed by: PHYSICIAN ASSISTANT

## 2022-09-10 PROCEDURE — 80048 BASIC METABOLIC PNL TOTAL CA: CPT | Performed by: PHYSICIAN ASSISTANT

## 2022-09-10 PROCEDURE — 85027 COMPLETE CBC AUTOMATED: CPT | Performed by: PHYSICIAN ASSISTANT

## 2022-09-10 ASSESSMENT — ACTIVITIES OF DAILY LIVING (ADL)
ADLS_ACUITY_SCORE: 18

## 2022-09-10 NOTE — PROGRESS NOTES
[facility] DISCHARGE NOTE    Patient discharged to home at 11:30 AM via ambulation. Accompanied by staff. Discharge instructions reviewed with patient, opportunity offered to ask questions. Prescriptions - None ordered for discharge. All belongings sent with patient.    Gladys Chowdhury RN

## 2022-09-10 NOTE — DISCHARGE SUMMARY
[facility] Hospitalist Discharge Summary    Santos Hall MRN# [id number]   Age: 26 year old YOB: 1995     Date of Admission:  9/9/2022  Date of Discharge::  9/10/2022  Admitting Physician:  Kojo Melgar MD  Discharge Physician:  Kojo Melgar MD  Primary Physician: Roberto Hartman  Transferring Facility: N/A     Home clinic: [facility]          Admission Diagnoses:   Spontaneous pneumothorax [J93.83]  Pleuritic chest pain [R07.81]  Abnormal CT of the chest [R93.89]  Lab test positive for detection of COVID-19 virus [U07.1]          Discharge Diagnosis:     Principle diagnosis: Spontaneous Pneumothorax  Pulmonary Blebs  Secondary diagnoses:  Patient Active Problem List   Diagnosis     Spontaneous pneumothorax     Pleuritic chest pain     Abnormal CT of the chest     Lab test positive for detection of COVID-19 virus     Exercise-induced asthma          Brief History of Presenting Illness:   As per admit hx  Santos Hall is a 26 year old male who presents with chest pain.     Yesterday while at work he developed chest pain that he initially felt was heartburn.  Though it evolved to a more stabbing chest pain in his left upper chest.  This occurred around the time of lifting heavy furniture at work though it was not directly associated with that.  Overnight he was unable to sleep due to the pain.  He was slightly short of breath this morning, further describes that as not being able to take a deep breath.  His pain is exacerbated by deep breath and certain positions.  He presented to the emergency department and is concerned there was something wrong with his heart.  His pain was relieved with IV Toradol.     He otherwise had developed a little bit of a scratchy throat, cough and general fatigue in the past 2 days.  He was ultimately found to be COVID-positive in the emergency department though feels his symptoms are very mild.     He denied fevers, chills, myalgias, lightheadedness, dizziness, palpitations,  "abdominal pain, nausea, vomiting, diarrhea or urinary          Hospital Course:     Spontaneous Pneumothorax  Pulmonary Blebs  Sudden onset of left-sided pleuritic chest pain, possibly associated with moving furniture though unclear if it really correlated with this.  CT noncontrast study on admission shows very small left apical pneumothorax with noted tiny blebs in the left upper lobe.  Vital stable with good oxygenation on room air.  Started on oxygen in the emergency department.  ED discussed with cardiothoracic surgery and surgery, no indication for chest tube at this time.  F/u CXR today stable-PTX unchanged. o2 sats stable on RA.      # Confirmed COVID-19 infection     Symptom Onset 9/7/22   Date of 1st Positive Test 9/9/22   Vaccination Status Fully Vaccinated         Mild symptoms of sore throat, fatigue starting 2 days prior to admission.  Incidentally found to be COVID-positive on presentation due to admission.  Stable. Quarantine 5days total.      Occasional Vaping   Occasional Marijuana use  Reports occasional vaping and marijuana use.  Not daily.  -Encourage cessation especially in setting of above                    Procedures:   No procedures performed during this admission         Allergies:      Allergies   Allergen Reactions     No Known Drug Allergies              Medications Prior to Admission:     No medications prior to admission.             Discharge Medications:   There are no discharge medications for this patient.          Consultations:   No consultations were requested during this admission            Discharge Exam:   Blood pressure 122/68, pulse 69, temperature 98.3  F (36.8  C), temperature source Oral, resp. rate 18, height 1.753 m (5' 9\"), weight 77.1 kg (170 lb), SpO2 100 %.  GENERAL APPEARANCE: healthy, alert and no distress  EYES: conjunctiva clear, eyes grossly normal  HENT: external ears and nose normal   NECK: supple, no masses or adenopathy  RESP: lungs clear to auscultation " - no rales, rhonchi or wheezes  CV: regular rate and rhythm, normal S1 S2, no S3 or S4 and no murmur, click or rub   ABDOMEN: soft, nontender, no HSM or masses and bowel sounds normal  MS: no clubbing, cyanosis; no edema  SKIN: clear without significant rashes or lesions  NEURO: Normal strength and tone, sensory exam grossly normal, mentation intact and speech normal    Unresulted Labs Ordered in the Past 30 Days of this Admission     No orders found for last 31 day(s).          Recent Results (from the past 24 hour(s))   Chest CT w/o contrast    Narrative    CT CHEST WITHOUT CONTRAST 9/9/2022 11:43 AM    CLINICAL HISTORY: Spontaneous left apical pneumothorax on chest x-ray.  Vaper. Evaluate for blebs versus other process that would contribute  to pneumothorax versus additional details.    TECHNIQUE: CT chest without IV contrast. Multiplanar reformats were  obtained. Dose reduction techniques were used.  CONTRAST: None.    COMPARISON: Chest x-ray dated 9/9/2022    FINDINGS:   LUNGS AND PLEURA: Very small left apical pneumothorax is present.  There are a couple of tiny blebs seen in the left upper lobe. No  significant emphysema, infiltrate, or effusion.    MEDIASTINUM/AXILLAE: No adenopathy or pericardial effusion. No  significant coronary artery calcification.    UPPER ABDOMEN: A few tiny calcifications are seen throughout the  spleen.    MUSCULOSKELETAL: Unremarkable.      Impression    IMPRESSION:   1.  Very small left apical pneumothorax. There are a couple of tiny  blebs in the left upper lobe, but the lungs are otherwise  unremarkable.    CHARITY CORNELL MD         SYSTEM ID:  [id number]   Chest XR,  PA & LAT    Narrative    EXAM: XR CHEST 2 VIEWS  LOCATION: [facility]  DATE/TIME: 9/10/2022 6:03 AM    INDICATION: Spontaneous left apical pneumothorax.  Blebs on chest CT.  Compared with x ray from 9 9 22  for interval chane  COMPARISON: CT dated 9/9/2022 1136 hours      Impression     IMPRESSION: There is a small left apical pneumothorax, similar to prior CT. The remainder of the lungs are otherwise clear. The cardiac silhouette is unremarkable.            Pending Tests at Discharge:   None         Discharge Instructions and Follow-Up:     Discharge diet: Regular   Discharge activity: Activity as tolerated   Discharge follow-up: Follow up with primary care provider as needed           Discharge Disposition:     Discharged to home      Attestation:  I have reviewed today's vital signs, notes, medications, labs and imaging.    Time Spent on this Encounter   I, Kojo Melgar MD, personally saw the patient today and spent less than or equal to 30 minutes discharging this patient.    Kojo Melgar MD

## 2022-09-10 NOTE — PROGRESS NOTES
O2 removed. Pts 02 sats remained within %. Pt feels comfortable without at this time. Education given on possible discharge/weaning of 02.

## 2022-09-10 NOTE — PROGRESS NOTES
"Pt alert, oriented, independent in room. Covid +. Has left sided chest pain with inspiration denies need for pain medications. Chest xray this am. Denies nausea, SOB. Lung sounds clear, diminished on left side. On 2L O2 via nc, for comfort. No cough present, regular diet. No new concerns. BP (!) 140/66 (BP Location: Right arm)   Pulse 59   Temp 98.3  F (36.8  C) (Oral)   Resp 18   Ht 1.753 m (5' 9\")   Wt 77.1 kg (170 lb)   SpO2 100%   BMI 25.10 kg/m      "

## 2022-09-14 ENCOUNTER — PATIENT OUTREACH (OUTPATIENT)
Dept: SURGERY | Facility: CLINIC | Age: 27
End: 2022-09-14

## 2022-09-14 NOTE — PROGRESS NOTES
Patient tested positive for Covid-19 on 9/9/2022. Is scheduled for a Chest X-ray and in person clinic visit with Dr Callejas today for follow-up from recent hospital stay. Patient still in 5 day quarantine time-frame. Dr Callejas updated. Per Dr Callejas, patient was not discharged with a Chest Tube so it is ok to reschedule for next or week after when patient is feeling better. Called patient to notify him of the need to reschedule his appointment. Patient verbalized his understand and appreciated rescheduling the appointment. Reports that he is currently feeling very fatigued due to the Covid. Informed patient that someone from our scheduling department will be in contact with him to reschedule his Chest Xray and clinic visit for September 28th.     Coretta Mccauley RN, BSN  Thoracic Surgery RN Care Coordinator

## 2022-09-26 NOTE — PROGRESS NOTES
THORACIC SURGERY - NEW PATIENT OFFICE VISIT      Dear Roberto Cox,    I saw Santos Hall in consultation for the evaluation and treatment of left primary spontaneous pneumothorax.    HPI  Santos Hall is a 26 year old man who presented to the ED on 9/9/2022 with complaints of stabbing left-sided chest pain after lifting heavy furniture. He was unable to sleep the previous night and felt shortness of breath. Workup in the ED included chest x-ray and CT thorax as shown below. He did not require a chest tube and was discharged from the ED in a stable condition. He was noted to be COVID positive in the ED.    Today he presents for his outpatient visit with thoracic surgery. At present, he is asymptomatic. He has never had other episodes of similar symptoms.      INTERVAL TESTS:    Chest x-ray 9/28/2022: No pneumothorax            ECOG performance status  0- Fully active, without restriction                                   Previsit Tests:    CT (9/9/2022): Small left pneumothorax with two tiny blebs        CXR (9/9/2022): Small left apical pneumothorax        Covid vaccination status: Vaccinated (1 dose)      PMH  None    PSH  No previous surgeries     Meds:  No current outpatient medications    ETOH: No  TOBACCO: Never smoker  OTHER DRUGS: Vapes almost everyday    Physical examination  Vitals:  /83   Pulse 64   Temp 98.3  F (36.8  C) (Oral)   Resp 16   SpO2 100%   Alert and oriented. NAD  Bilateral breath sounds.        From a personal perspective, he lives in [place] very close to the [facility]. 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 He does not engage in high risk social activities.    IMPRESSION   Santos Hall is a 26 year old man with left primary spontaneous pneumothorax.    PLAN  I spent 30 min on the date of the encounter in chart review, patient visit, review of tests, documentation and/or discussion with other providers about the issues documented above. I  reviewed the plan as follows:    - We discussed management options with the patient.  He would prefer to hold off on surgical intervention and pursue conservative management  He understands the symptoms and signs of recurrent pneumothorax and to present to the nearest hospital in that situation.    - I strongly encouraged him to quit vaping.     All questions were answered and Santos Hall was in agreement with the plan.  I appreciate the opportunity to participate in the care of your patient and will keep you updated.    Kendall Bray MD

## 2022-09-28 ENCOUNTER — OFFICE VISIT (OUTPATIENT)
Dept: SURGERY | Facility: CLINIC | Age: 27
End: 2022-09-28
Attending: CLINICAL NURSE SPECIALIST
Payer: COMMERCIAL

## 2022-09-28 VITALS
HEART RATE: 64 BPM | DIASTOLIC BLOOD PRESSURE: 83 MMHG | TEMPERATURE: 98.3 F | RESPIRATION RATE: 16 BRPM | OXYGEN SATURATION: 100 % | SYSTOLIC BLOOD PRESSURE: 138 MMHG

## 2022-09-28 DIAGNOSIS — J93.83 SPONTANEOUS PNEUMOTHORAX: Primary | ICD-10-CM

## 2022-09-28 PROCEDURE — 99203 OFFICE O/P NEW LOW 30 MIN: CPT | Performed by: THORACIC SURGERY (CARDIOTHORACIC VASCULAR SURGERY)

## 2022-09-28 PROCEDURE — G0463 HOSPITAL OUTPT CLINIC VISIT: HCPCS

## 2022-09-28 ASSESSMENT — PAIN SCALES - GENERAL: PAINLEVEL: NO PAIN (0)

## 2022-09-28 NOTE — NURSING NOTE
"Oncology Rooming Note    September 28, 2022 9:53 AM   Santos Hall is a 26 year old male who presents for:    Chief Complaint   Patient presents with     Oncology Clinic Visit     Spontaneous pneumothorax     Initial Vitals: /83   Pulse 64   Temp 98.3  F (36.8  C) (Oral)   Resp 16   SpO2 100%  Estimated body mass index is 25.1 kg/m  as calculated from the following:    Height as of 9/9/22: 1.753 m (5' 9\").    Weight as of 9/9/22: 77.1 kg (170 lb). There is no height or weight on file to calculate BSA.  No Pain (0) Comment: Data Unavailable   No LMP for male patient.  Allergies reviewed: Yes  Medications reviewed: Yes    Medications: Medication refills not needed today.  Pharmacy name entered into [employer]Â®: [id number] [place] [address]    Clinical concerns: Pt no longer has COVID symptoms.        [facility]              "

## 2022-09-28 NOTE — LETTER
9/28/2022         RE: Santos Hall  [id number] [facility] [address]        Dear Colleague,    Thank you for referring your patient, Santos Hall, to the [facility]. Please see a copy of my visit note below.    THORACIC SURGERY - NEW PATIENT OFFICE VISIT      Dear Roberto Cox,    I saw Santos Hall in consultation for the evaluation and treatment of left primary spontaneous pneumothorax.    HPI  Santos Hall is a 26 year old man who presented to the ED on 9/9/2022 with complaints of stabbing left-sided chest pain after lifting heavy furniture. He was unable to sleep the previous night and felt shortness of breath. Workup in the ED included chest x-ray and CT thorax as shown below. He did not require a chest tube and was discharged from the ED in a stable condition. He was noted to be COVID positive in the ED.    Today he presents for his outpatient visit with thoracic surgery. At present, he is asymptomatic. He has never had other episodes of similar symptoms.      INTERVAL TESTS:    Chest x-ray 9/28/2022: No pneumothorax            ECOG performance status  0- Fully active, without restriction                                   Previsit Tests:    CT (9/9/2022): Small left pneumothorax with two tiny blebs        CXR (9/9/2022): Small left apical pneumothorax        Covid vaccination status: Vaccinated (1 dose)      PMH  None    PSH  No previous surgeries     Meds:  No current outpatient medications    ETOH: No  TOBACCO: Never smoker  OTHER DRUGS: Vapes almost everyday    Physical examination  Vitals:  /83   Pulse 64   Temp 98.3  F (36.8  C) (Oral)   Resp 16   SpO2 100%   Alert and oriented. NAD  Bilateral breath sounds.        From a personal perspective, he lives in [place] very close to the [facility]. 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 He does not engage in high risk social activities.    IMPRESSION   Santos Hall is a  26 year old man with left primary spontaneous pneumothorax.    PLAN  I spent 30 min on the date of the encounter in chart review, patient visit, review of tests, documentation and/or discussion with other providers about the issues documented above. I reviewed the plan as follows:    - We discussed management options with the patient.  He would prefer to hold off on surgical intervention and pursue conservative management  He understands the symptoms and signs of recurrent pneumothorax and to present to the nearest hospital in that situation.    - I strongly encouraged him to quit vaping.     All questions were answered and Santos Hall was in agreement with the plan.  I appreciate the opportunity to participate in the care of your patient and will keep you updated.    Kendall Bray MD

## 2022-11-19 ENCOUNTER — HEALTH MAINTENANCE LETTER (OUTPATIENT)
Age: 27
End: 2022-11-19

## 2023-07-01 ENCOUNTER — HEALTH MAINTENANCE LETTER (OUTPATIENT)
Age: 28
End: 2023-07-01

## 2024-05-27 SDOH — HEALTH STABILITY: PHYSICAL HEALTH: ON AVERAGE, HOW MANY MINUTES DO YOU ENGAGE IN EXERCISE AT THIS LEVEL?: 150+ MIN

## 2024-05-27 SDOH — HEALTH STABILITY: PHYSICAL HEALTH: ON AVERAGE, HOW MANY DAYS PER WEEK DO YOU ENGAGE IN MODERATE TO STRENUOUS EXERCISE (LIKE A BRISK WALK)?: 5 DAYS

## 2024-05-27 ASSESSMENT — ASTHMA QUESTIONNAIRES
QUESTION_5 LAST FOUR WEEKS HOW WOULD YOU RATE YOUR ASTHMA CONTROL: COMPLETELY CONTROLLED
QUESTION_2 LAST FOUR WEEKS HOW OFTEN HAVE YOU HAD SHORTNESS OF BREATH: NOT AT ALL
ACT_TOTALSCORE: 25
ACT_TOTALSCORE: 25
QUESTION_1 LAST FOUR WEEKS HOW MUCH OF THE TIME DID YOUR ASTHMA KEEP YOU FROM GETTING AS MUCH DONE AT WORK, SCHOOL OR AT HOME: NONE OF THE TIME
QUESTION_4 LAST FOUR WEEKS HOW OFTEN HAVE YOU USED YOUR RESCUE INHALER OR NEBULIZER MEDICATION (SUCH AS ALBUTEROL): NOT AT ALL
QUESTION_3 LAST FOUR WEEKS HOW OFTEN DID YOUR ASTHMA SYMPTOMS (WHEEZING, COUGHING, SHORTNESS OF BREATH, CHEST TIGHTNESS OR PAIN) WAKE YOU UP AT NIGHT OR EARLIER THAN USUAL IN THE MORNING: NOT AT ALL

## 2024-05-27 ASSESSMENT — SOCIAL DETERMINANTS OF HEALTH (SDOH): HOW OFTEN DO YOU GET TOGETHER WITH FRIENDS OR RELATIVES?: NEVER

## 2024-05-30 ENCOUNTER — OFFICE VISIT (OUTPATIENT)
Dept: INTERNAL MEDICINE | Facility: CLINIC | Age: 29
End: 2024-05-30
Payer: COMMERCIAL

## 2024-05-30 VITALS
HEART RATE: 75 BPM | HEIGHT: 69 IN | SYSTOLIC BLOOD PRESSURE: 138 MMHG | WEIGHT: 203.6 LBS | TEMPERATURE: 98.3 F | DIASTOLIC BLOOD PRESSURE: 79 MMHG | RESPIRATION RATE: 17 BRPM | OXYGEN SATURATION: 99 % | BODY MASS INDEX: 30.16 KG/M2

## 2024-05-30 DIAGNOSIS — Z00.00 ENCOUNTER FOR GENERAL HEALTH EXAMINATION: ICD-10-CM

## 2024-05-30 DIAGNOSIS — Z11.3 SCREEN FOR STD (SEXUALLY TRANSMITTED DISEASE): ICD-10-CM

## 2024-05-30 DIAGNOSIS — F17.200 VAPING NICOTINE DEPENDENCE, NON-TOBACCO PRODUCT: ICD-10-CM

## 2024-05-30 DIAGNOSIS — Z87.820 HISTORY OF CONCUSSION: Primary | ICD-10-CM

## 2024-05-30 DIAGNOSIS — Z13.220 SCREENING FOR HYPERLIPIDEMIA: ICD-10-CM

## 2024-05-30 PROBLEM — R93.89 ABNORMAL CT OF THE CHEST: Status: RESOLVED | Noted: 2022-09-09 | Resolved: 2024-05-30

## 2024-05-30 PROBLEM — U07.1 LAB TEST POSITIVE FOR DETECTION OF COVID-19 VIRUS: Status: RESOLVED | Noted: 2022-09-09 | Resolved: 2024-05-30

## 2024-05-30 PROBLEM — R07.81 PLEURITIC CHEST PAIN: Status: RESOLVED | Noted: 2022-09-09 | Resolved: 2024-05-30

## 2024-05-30 LAB
ERYTHROCYTE [DISTWIDTH] IN BLOOD BY AUTOMATED COUNT: 12.6 % (ref 10–15)
HCT VFR BLD AUTO: 48.2 % (ref 40–53)
HGB BLD-MCNC: 16.4 G/DL (ref 13.3–17.7)
MCH RBC QN AUTO: 28.7 PG (ref 26.5–33)
MCHC RBC AUTO-ENTMCNC: 34 G/DL (ref 31.5–36.5)
MCV RBC AUTO: 84 FL (ref 78–100)
PLATELET # BLD AUTO: 219 10E3/UL (ref 150–450)
RBC # BLD AUTO: 5.71 10E6/UL (ref 4.4–5.9)
T PALLIDUM AB SER QL: NONREACTIVE
WBC # BLD AUTO: 4.8 10E3/UL (ref 4–11)

## 2024-05-30 PROCEDURE — 99385 PREV VISIT NEW AGE 18-39: CPT | Performed by: INTERNAL MEDICINE

## 2024-05-30 PROCEDURE — 85027 COMPLETE CBC AUTOMATED: CPT | Performed by: INTERNAL MEDICINE

## 2024-05-30 PROCEDURE — 83721 ASSAY OF BLOOD LIPOPROTEIN: CPT | Performed by: INTERNAL MEDICINE

## 2024-05-30 PROCEDURE — 87591 N.GONORRHOEAE DNA AMP PROB: CPT | Performed by: INTERNAL MEDICINE

## 2024-05-30 PROCEDURE — 80053 COMPREHEN METABOLIC PANEL: CPT | Performed by: INTERNAL MEDICINE

## 2024-05-30 PROCEDURE — 36415 COLL VENOUS BLD VENIPUNCTURE: CPT | Performed by: INTERNAL MEDICINE

## 2024-05-30 PROCEDURE — 86780 TREPONEMA PALLIDUM: CPT | Performed by: INTERNAL MEDICINE

## 2024-05-30 PROCEDURE — 87491 CHLMYD TRACH DNA AMP PROBE: CPT | Performed by: INTERNAL MEDICINE

## 2024-05-30 ASSESSMENT — PAIN SCALES - GENERAL: PAINLEVEL: NO PAIN (0)

## 2024-05-30 NOTE — PROGRESS NOTES
"Preventive Care Visit  [facility]  Charles Contreras MD, Internal Medicine  May 30, 2024    Assessment & Plan     History of concussion  In youth, not severe, no apparent sequelae    Vaping nicotine dependence, non-tobacco product  We discussed avoiding cigarettes and the dangers of tobacco (which he doesn't use).     Encounter for general health examination  No active health issues on history and examination today.   - CBC with platelets  - Comprehensive metabolic panel    Screening for hyperlipidemia  - LDL cholesterol direct    Screen for STD (sexually transmitted disease)  He is monogamous, no history.   - Chlamydia & Gonorrhea by PCR, [facility] - Clinic Collect  - Treponema Abs w Reflex to RPR and Titer    BMI  Estimated body mass index is 30.07 kg/m  as calculated from the following:    Height as of this encounter: 1.753 m (5' 9\").    Weight as of this encounter: 92.4 kg (203 lb 9.6 oz).     Counseling  Appropriate preventive services were discussed with this patient.   Reviewed patient's diet, addressing concerns and/or questions.     Follow up one year and as needed.     Jolynn Graham is a 28 year old, presenting for the following:  Recheck Medication and Physical (STD testing)        5/30/2024     7:10 AM   Additional Questions   Roomed by wily siddiqi   Accompanied by self     Health Care Directive  Patient does not have a Health Care Directive or Living Will: Discussed advance care planning with patient; however, patient declined at this time.    HPI  Feels well, overall.  Tolerates physical stress of work as an .  Goes to the gym as well.  Was athletic in high school with some exercise induced asthma, which has been better as he gets older.  Closed head injury in youth, fell and hit his head, transient LOC no hospitalization, no issues since. Denies bowel or bladder issues.           5/27/2024   General Health   How would you rate your overall physical health? Good   Feel " stress (tense, anxious, or unable to sleep) Only a little   (!) STRESS CONCERN      5/27/2024   Nutrition   Three or more servings of calcium each day? Yes   Diet: Regular (no restrictions)   How many servings of fruit and vegetables per day? (!) 0-1   How many sweetened beverages each day? (!) 2         5/27/2024   Exercise   Days per week of moderate/strenous exercise 5 days   Average minutes spent exercising at this level 150+ min         5/27/2024   Social Factors   Frequency of gathering with friends or relatives Never   Worry food won't last until get money to buy more No   Food not last or not have enough money for food? No   Do you have housing?  Yes   Are you worried about losing your housing? No   Lack of transportation? No   Unable to get utilities (heat,electricity)? No   (!) SOCIAL CONNECTIONS CONCERN      5/27/2024   Dental   Dentist two times every year? (!) NO         5/27/2024   TB Screening   Were you born outside of the [place]? No     Today's PHQ-2 Score:       5/30/2024     7:05 AM   PHQ-2 ( 1999 [employer])   Q1: Little interest or pleasure in doing things 0   Q2: Feeling down, depressed or hopeless 0   PHQ-2 Score 0   Q1: Little interest or pleasure in doing things Not at all   Q2: Feeling down, depressed or hopeless Not at all   PHQ-2 Score 0         5/27/2024   Substance Use   Alcohol more than 3/day or more than 7/wk No   Do you use any other substances recreationally? (!) ALCOHOL    (!) CANNABIS PRODUCTS     Social History     Tobacco Use    Smoking status: Never    Smokeless tobacco: Never   Vaping Use    Vaping status: Every Day   Substance Use Topics    Alcohol use: No     Alcohol/week: 0.0 standard drinks of alcohol    Drug use: No         5/27/2024   STI Screening   New sexual partner(s) since last STI/HIV test? No         5/27/2024   Contraception/Family Planning   Questions about contraception or family planning No   Reviewed and updated as needed this visit by Provider    Past Medical  "History:   Diagnosis Date    Spontaneous pneumothorax      History reviewed. No pertinent surgical history.    Review of Systems  See HPI    Objective    PHYSICAL EXAM:  General Appearance: In no acute distress  Vitals:    05/30/24 0714 05/30/24 0716   BP: (!) 140/81 138/79   BP Location: Left arm Left arm   Patient Position: Sitting Sitting   Cuff Size: Adult Large Adult Large   Pulse: 75    Resp: 17    Temp: 98.3  F (36.8  C)    SpO2: 99%    Weight: 92.4 kg (203 lb 9.6 oz)    Height: 1.753 m (5' 9\")      Estimated body mass index is 30.07 kg/m  as calculated from the following:    Height as of this encounter: 1.753 m (5' 9\").    Weight as of this encounter: 92.4 kg (203 lb 9.6 oz).  EYES: Clear, fundi are unremarkable   HEENT: nose and throat clear, ears normal   NECK:  without cervical or axillary adenopathy  RESPIRATORY: Clear   CARDIOVASCULAR: S1, S2  ABDOMEN: soft, flat, and non-tender  EXTREMITIES:  no significant inflammation or edema  NEUROLOGIC: Speech is clear, gait is normal  PSYCHIATRIC: Oriented X 3, thinking is clear     Signed Electronically by: Charles Contreras MD  "

## 2024-05-30 NOTE — LETTER
June 1, 2024      Santosdemond Hall  [address]  [facility] [address]        Dear ,    We are writing to inform you of your test results.    STD tests are negative.  The Blood counts and blood chemistries are good.  The cholesterol level is good.  One  liver tests, the AST, is mildly abnormal.  This could be related to mild alcohol use.  It is not very significant.  We can repeat this test in the future, but it is not considered significant.     Resulted Orders   Chlamydia & Gonorrhea by PCR, [facility] - Clinic Collect   Result Value Ref [facility]    Chlamydia Trachomatis Negative Negative      Comment:      Negative for C. trachomatis rRNA by transcription mediated amplification.   A negative result by transcription mediated amplification does not preclude the presence of infection because results are dependent on proper and adequate collection, absence of inhibitors and sufficient rRNA to be detected.    Neisseria gonorrhoeae Negative Negative      Comment:      Negative for N. gonorrhoeae rRNA by transcription mediated amplification. A negative result by transcription mediated amplification does not preclude the presence of C. trachomatis infection because results are dependent on proper and adequate collection, absence of inhibitors and sufficient rRNA to be detected.   CBC with platelets   Result Value Ref [facility]    WBC Count 4.8 4.0 - 11.0 10e3/uL    RBC Count 5.71 4.40 - 5.90 10e6/uL    Hemoglobin 16.4 13.3 - 17.7 g/dL    Hematocrit 48.2 40.0 - 53.0 %    MCV 84 78 - 100 fL    MCH 28.7 26.5 - 33.0 pg    MCHC 34.0 31.5 - 36.5 g/dL    RDW 12.6 10.0 - 15.0 %    Platelet Count 219 150 - 450 10e3/uL   Comprehensive metabolic panel   Result Value Ref [facility]    Sodium 137 135 - 145 mmol/L      Comment:      Reference intervals for this test were updated on 09/26/2023 to more accurately reflect our healthy population. There may be differences in the flagging of prior results with similar values performed with this  method. Interpretation of those prior results can be made in the context of the updated reference intervals.     Potassium 3.8 3.4 - 5.3 mmol/L    Carbon Dioxide (CO2) 24 22 - 29 mmol/L    Anion Gap 11 7 - 15 mmol/L    Urea Nitrogen 29.3 (H) 6.0 - 20.0 mg/dL    Creatinine 1.11 0.67 - 1.17 mg/dL    GFR Estimate >90 >60 mL/min/1.73m2    Calcium 9.5 8.6 - 10.0 mg/dL    Chloride 102 98 - 107 mmol/L    Glucose 90 70 - 99 mg/dL    Alkaline Phosphatase 64 40 - 150 U/L    AST 98 (H) 0 - 45 U/L      Comment:      Reference intervals for this test were updated on 6/12/2023 to more accurately reflect our healthy population. There may be differences in the flagging of prior results with similar values performed with this method. Interpretation of those prior results can be made in the context of the updated reference intervals.    ALT 67 0 - 70 U/L      Comment:      Reference intervals for this test were updated on 6/12/2023 to more accurately reflect our healthy population. There may be differences in the flagging of prior results with similar values performed with this method. Interpretation of those prior results can be made in the context of the updated reference intervals.      Protein Total 7.1 6.4 - 8.3 g/dL    Albumin 4.8 3.5 - 5.2 g/dL    Bilirubin Total 0.6 <=1.2 mg/dL   LDL cholesterol direct   Result Value Ref [facility]    LDL Cholesterol Direct 119 (H) <100 mg/dL      Comment:      Age 2-19 years:  Desirable: 0-110 mg/dL   Borderline high: 110-129 mg/dL   High: >= 130 mg/dL    Age 20 years and older:  Desirable: <100mg/dL  Above desirable: 100-129 mg/dL   Borderline high: 130-159 mg/dL   High: 160-189 mg/dL   Very high: >= 190 mg/dL   Treponema Abs w Reflex to RPR and Titer   Result Value Ref [facility]    Treponema Antibody Total Nonreactive Nonreactive       If you have any questions or concerns, please call the clinic at the number listed above.       Sincerely,      Charles Contreras MD

## 2024-05-31 LAB
ALBUMIN SERPL BCG-MCNC: 4.8 G/DL (ref 3.5–5.2)
ALP SERPL-CCNC: 64 U/L (ref 40–150)
ALT SERPL W P-5'-P-CCNC: 67 U/L (ref 0–70)
ANION GAP SERPL CALCULATED.3IONS-SCNC: 11 MMOL/L (ref 7–15)
AST SERPL W P-5'-P-CCNC: 98 U/L (ref 0–45)
BILIRUB SERPL-MCNC: 0.6 MG/DL
BUN SERPL-MCNC: 29.3 MG/DL (ref 6–20)
C TRACH DNA SPEC QL PROBE+SIG AMP: NEGATIVE
CALCIUM SERPL-MCNC: 9.5 MG/DL (ref 8.6–10)
CHLORIDE SERPL-SCNC: 102 MMOL/L (ref 98–107)
CREAT SERPL-MCNC: 1.11 MG/DL (ref 0.67–1.17)
DEPRECATED HCO3 PLAS-SCNC: 24 MMOL/L (ref 22–29)
EGFRCR SERPLBLD CKD-EPI 2021: >90 ML/MIN/1.73M2
GLUCOSE SERPL-MCNC: 90 MG/DL (ref 70–99)
LDLC SERPL DIRECT ASSAY-MCNC: 119 MG/DL
N GONORRHOEA DNA SPEC QL NAA+PROBE: NEGATIVE
POTASSIUM SERPL-SCNC: 3.8 MMOL/L (ref 3.4–5.3)
PROT SERPL-MCNC: 7.1 G/DL (ref 6.4–8.3)
SODIUM SERPL-SCNC: 137 MMOL/L (ref 135–145)

## 2025-05-08 ENCOUNTER — PATIENT OUTREACH (OUTPATIENT)
Dept: CARE COORDINATION | Facility: CLINIC | Age: 30
End: 2025-05-08
Payer: COMMERCIAL

## 2025-07-12 ENCOUNTER — HEALTH MAINTENANCE LETTER (OUTPATIENT)
Age: 30
End: 2025-07-12